# Patient Record
Sex: MALE | URBAN - METROPOLITAN AREA
[De-identification: names, ages, dates, MRNs, and addresses within clinical notes are randomized per-mention and may not be internally consistent; named-entity substitution may affect disease eponyms.]

---

## 2017-09-03 ENCOUNTER — NURSE TRIAGE (OUTPATIENT)
Dept: NURSING | Facility: CLINIC | Age: 37
End: 2017-09-03

## 2017-09-03 NOTE — TELEPHONE ENCOUNTER
Naga was seen for llq abdominal pain at [facility] recently.  His next step was going to be a CT scan. His symptoms resolved. Today he has rlq abdominal pain and blood in his stool.  I instructed ER.  He did not want the extra costs of an ER visit and asked that I call the on call provider and ask if they would order a CT scan.  I spoke to Dr El. She stated emphatically that Naga needs to go to the ER. I gave Naga this information and he stated understanding and agreement.  Desire ANGELA RN [facility]